# Patient Record
(demographics unavailable — no encounter records)

---

## 2024-10-23 NOTE — HISTORY OF PRESENT ILLNESS
[FreeTextEntry1] : 79 year old male with PMHX of elevated CA score ( medically treated since 2005 ) and thoracic aortic ectasia  here for follow up.   Since last visit, he remains to only take HCTZ 12.5mg PRN edema, Atorvastatin 10mg QD, Toprol 12.5mg QD and Ramipril 10mg QD. His at home blood pressures 120- 140/ 70-80's mmHg.   He walks 4 miles daily and walks up to his home on the 11th floor without complications. He occasionally walks up 20 flights for fun. He does reports some dyspnea if he is rushing. He has no chest pain.   He occasionally notices edema with traveling. This has been controlled with the HCTZ PRN. He recently returned from [place] in June. He has no orthopnea or pnd.   He occasionally feels off balance if he stands too long. He has no falls or syncope. He reports no palpitations or shortness of breath at rest and no neuro focal deficits.   He remains neck and right shoulder pains. He still does physical therapy once a week. He still follows up with PCP Dr Jerardo Cortes    Previous Work UP  CA SCore ( 2005 ) elevated at [facility] ( 07/27/2022 ) Total CHol 148, HDL 82, LDL 55, A1c 5.5, TSH 3.03 ECHO ( 11/16/2022 ) mild AR, mild MR, mild TR, Aortic Root at 3.8 cm STRESS EKG ( 11/16/2022 ) exercised for 7:52 min, 10.10 METS, normal 1/2024 ett negative at [facility] ( 09/30/2021 ) AO 4.1 cm, EF 60%, normal wall motion, mild TR, mild MR, mild AR

## 2024-10-23 NOTE — REVIEW OF SYSTEMS
[Fever] : no fever [Chills] : no chills [SOB] : no shortness of breath [Dyspnea on exertion] : not dyspnea during exertion [Chest Discomfort] : no chest discomfort [Lower Ext Edema] : no extremity edema [Leg Claudication] : no intermittent leg claudication [Palpitations] : no palpitations [Orthopnea] : no orthopnea [PND] : no PND [Syncope] : no syncope [Cough] : no cough [Dizziness] : no dizziness [Numbness (Hypoesthesia)] : no numbness [Weakness] : no weakness [Speech Disturbance] : no speech disturbance [Easy Bleeding] : no tendency for easy bleeding

## 2024-10-23 NOTE — DISCUSSION/SUMMARY
[FreeTextEntry1] : 79 year old male with PMHX of elevated CA score ( medically treated since 2005 ) and thoracic aortic ectasia  here for follow up.   CVD RisK: He is active without complications. EKG SR 56 bpm. Last stress ekg noted. Significant CAD not likely. Will continue with risk factor optimization.  HTN: Controlled. Continue with Ramipril 10mg QD HLD: LDL goal <100. Currently controlled. Continue with Atorvastatin 10mg QD Clearance: RCRI revealed 0 points. Patient is low risk  pt had negative bilateral dopplers of both legs advise decrease sodium consumption

## 2025-07-09 NOTE — HISTORY OF PRESENT ILLNESS
[FreeTextEntry1] : 79 year old male with PMHX of elevated CA score ( medically treated since 2005 ) and thoracic aortic ectasia  here for follow up.   Since last visit, he remains to only take HCTZ 12.5mg PRN edema, Atorvastatin 10mg QD, Toprol 12.5mg QD and Ramipril 10mg QD. His at home blood pressures 120- 140/ 70-80's mmHg.   He walks 4 miles daily and walks up to his home on the 11th floor without complications. He occasionally walks up 20 flights for fun. He does reports some dyspnea if he is rushing. He has no chest pain.   He occasionally notices edema with traveling. This has been controlled with the HCTZ PRN. He recently returned from [place] in June. He has no orthopnea or pnd.   He occasionally feels off balance if he stands too long. He has no falls or syncope. He reports no palpitations or shortness of breath at rest and no neuro focal deficits.   He remains neck and right shoulder pains. He still does physical therapy once a week. He still follows up with PCP Dr Jerardo Cortes    Previous Work UP  CA SCore ( 2005 ) elevated at [facility] ( 07/27/2022 ) Total CHol 148, HDL 82, LDL 55, A1c 5.5, TSH 3.03 ECHO ( 11/16/2022 ) mild AR, mild MR, mild TR, Aortic Root at 3.8 cm STRESS EKG ( 11/16/2022 ) exercised for 7:52 min, 10.10 METS, normal 1/2024 ett negative at [facility] ( 09/30/2021 ) AO 4.1 cm, EF 60%, normal wall motion, mild TR, mild MR, mild AR

## 2025-07-09 NOTE — HISTORY OF PRESENT ILLNESS
[FreeTextEntry1] : 79 year old male with PMHX of elevated CA score ( medically treated since 2005 ) and thoracic aortic ectasia  here for follow up.   Since last visit, he remains to only take HCTZ 12.5mg PRN edema, Atorvastatin 10mg QD, Toprol 12.5mg QD and Ramipril 10mg QD. His at home blood pressures 120- 140/ 70-80's mmHg.   He walks 4 miles daily and walks up to his home on the 11th floor without complications. He occasionally walks up 20 flights for fun. He does reports some dyspnea if he is rushing. He has no chest pain.   He occasionally notices edema with traveling. This has been controlled with the HCTZ PRN. He recently returned from [place] in June. He has no orthopnea or pnd.   He occasionally feels off balance if he stands too long. He has no falls or syncope. He reports no palpitations or shortness of breath at rest and no neuro focal deficits.   He remains neck and right shoulder pains. He still does physical therapy once a week. He still follows up with PCP Dr Jerardo Cortes    Previous Work UP  CA SCore ( 2005 ) elevated at [facility] ( 07/27/2022 ) Total CHol 148, HDL 82, LDL 55, A1c 5.5, TSH 3.03 ECHO ( 11/16/2022 ) mild AR, mild MR, mild TR, Aortic Root at 3.8 cm STRESS EKG ( 11/16/2022 ) exercised for 7:52 min, 10.10 METS, normal 1/2024 ett negative at [facility] ( 09/30/2021 ) AO 4.1 cm, EF 60%, normal wall motion, mild TR, mild MR, mild AR

## 2025-07-09 NOTE — DISCUSSION/SUMMARY
[FreeTextEntry1] : 79 year old male with PMHX of elevated CA score ( medically treated since 2005 ) and thoracic aortic ectasia  here for follow up.   CVD RisK: He is active without complications. EKG SR 56 bpm. Last stress ekg noted. Significant CAD not likely. Will continue with risk factor optimization.  HTN: Controlled. Continue with Ramipril 10mg QD HLD: LDL goal <100. Currently controlled. Continue with Atorvastatin 10mg QD Clearance: RCRI revealed 0 points. Patient is low risk  pt had negative bilateral dopplers of both legs advise decrease sodium consumption pt will return for ett and echo prior to complex gi surgery